# Patient Record
Sex: MALE | Race: WHITE | Employment: OTHER | URBAN - METROPOLITAN AREA
[De-identification: names, ages, dates, MRNs, and addresses within clinical notes are randomized per-mention and may not be internally consistent; named-entity substitution may affect disease eponyms.]

---

## 2021-01-21 ENCOUNTER — APPOINTMENT (OUTPATIENT)
Dept: CT IMAGING | Facility: CLINIC | Age: 86
End: 2021-01-21
Attending: EMERGENCY MEDICINE
Payer: MEDICARE

## 2021-01-21 ENCOUNTER — HOSPITAL ENCOUNTER (EMERGENCY)
Facility: CLINIC | Age: 86
Discharge: HOME | End: 2021-01-21
Attending: EMERGENCY MEDICINE | Admitting: EMERGENCY MEDICINE
Payer: MEDICARE

## 2021-01-21 VITALS
HEART RATE: 79 BPM | TEMPERATURE: 97.6 F | RESPIRATION RATE: 16 BRPM | SYSTOLIC BLOOD PRESSURE: 157 MMHG | OXYGEN SATURATION: 93 % | DIASTOLIC BLOOD PRESSURE: 96 MMHG

## 2021-01-21 DIAGNOSIS — R42 VERTIGO: ICD-10-CM

## 2021-01-21 LAB
ANION GAP SERPL CALCULATED.3IONS-SCNC: 3 MMOL/L (ref 3–14)
BASOPHILS # BLD AUTO: 0 10E9/L (ref 0–0.2)
BASOPHILS NFR BLD AUTO: 0.6 %
BUN SERPL-MCNC: 23 MG/DL (ref 7–30)
CALCIUM SERPL-MCNC: 9.3 MG/DL (ref 8.5–10.1)
CHLORIDE SERPL-SCNC: 108 MMOL/L (ref 94–109)
CO2 SERPL-SCNC: 27 MMOL/L (ref 20–32)
CREAT SERPL-MCNC: 0.56 MG/DL (ref 0.66–1.25)
DIFFERENTIAL METHOD BLD: ABNORMAL
EOSINOPHIL # BLD AUTO: 0.1 10E9/L (ref 0–0.7)
EOSINOPHIL NFR BLD AUTO: 2.8 %
ERYTHROCYTE [DISTWIDTH] IN BLOOD BY AUTOMATED COUNT: 13.5 % (ref 10–15)
GFR SERPL CREATININE-BSD FRML MDRD: >90 ML/MIN/{1.73_M2}
GLUCOSE SERPL-MCNC: 120 MG/DL (ref 70–99)
HCT VFR BLD AUTO: 42.1 % (ref 40–53)
HGB BLD-MCNC: 14.2 G/DL (ref 13.3–17.7)
IMM GRANULOCYTES # BLD: 0 10E9/L (ref 0–0.4)
IMM GRANULOCYTES NFR BLD: 0.6 %
LYMPHOCYTES # BLD AUTO: 0.8 10E9/L (ref 0.8–5.3)
LYMPHOCYTES NFR BLD AUTO: 16.3 %
MCH RBC QN AUTO: 30.8 PG (ref 26.5–33)
MCHC RBC AUTO-ENTMCNC: 33.7 G/DL (ref 31.5–36.5)
MCV RBC AUTO: 91 FL (ref 78–100)
MONOCYTES # BLD AUTO: 0.4 10E9/L (ref 0–1.3)
MONOCYTES NFR BLD AUTO: 7.7 %
NEUTROPHILS # BLD AUTO: 3.5 10E9/L (ref 1.6–8.3)
NEUTROPHILS NFR BLD AUTO: 72 %
NRBC # BLD AUTO: 0 10*3/UL
NRBC BLD AUTO-RTO: 0 /100
PLATELET # BLD AUTO: 126 10E9/L (ref 150–450)
POTASSIUM SERPL-SCNC: 3.5 MMOL/L (ref 3.4–5.3)
RBC # BLD AUTO: 4.61 10E12/L (ref 4.4–5.9)
SODIUM SERPL-SCNC: 138 MMOL/L (ref 133–144)
WBC # BLD AUTO: 4.9 10E9/L (ref 4–11)

## 2021-01-21 PROCEDURE — 99285 EMERGENCY DEPT VISIT HI MDM: CPT | Mod: 25

## 2021-01-21 PROCEDURE — 85025 COMPLETE CBC W/AUTO DIFF WBC: CPT | Performed by: EMERGENCY MEDICINE

## 2021-01-21 PROCEDURE — 80048 BASIC METABOLIC PNL TOTAL CA: CPT | Performed by: EMERGENCY MEDICINE

## 2021-01-21 PROCEDURE — 93005 ELECTROCARDIOGRAM TRACING: CPT

## 2021-01-21 PROCEDURE — 70450 CT HEAD/BRAIN W/O DYE: CPT

## 2021-01-21 RX ORDER — MECLIZINE HYDROCHLORIDE 25 MG/1
25 TABLET ORAL EVERY 6 HOURS PRN
Qty: 10 TABLET | Refills: 1 | Status: SHIPPED | OUTPATIENT
Start: 2021-01-21

## 2021-01-21 ASSESSMENT — ENCOUNTER SYMPTOMS
DIZZINESS: 1
NUMBNESS: 0
ABDOMINAL PAIN: 1
VOMITING: 1
FEVER: 0
SPEECH DIFFICULTY: 0
DIARRHEA: 0
WEAKNESS: 0
HEADACHES: 0

## 2021-01-21 NOTE — ED TRIAGE NOTES
Pt presents via EMS with c/o N/V and lower abdominal pain.  Pt believes he got food poisoning from eating blueberries around 1230p.     Pt received 4mg IV Zofran from EMS.  Currently abdominal pain and nausea is resolved.  ABCs intact, A&Ox4

## 2021-01-21 NOTE — ED PROVIDER NOTES
History     Chief Complaint:  Vomiting and Abdominal Pain     HPI  Derrick Peterson is a 85 year old male who presents for evaluation of vomiting, dizziness and abdominal pain.  The patient states that he went to his exercise class per usual this morning and was then working at his desk, but when he went to stand up, he began suffering right sided abdominal pain and dizziness which caused him to fall to the floor.  He then began suffering a half hour episode of emesis.  This prompted his wife to call EMS.  Here, he states that all of his symptoms are resolved and he feels back to normal now.  He denies any diarrhea and headaches.  He also denies any previous illness.    He does note that he has had food poisoning before but has been otherwise healthy his whole life.  He denies any history of heart problems and diabetes.      Allergies:  No Known Allergies    Medications:   Patient denies any current medications.    Medical History:   Patient denies diabetes and heart conditions.    Social History:  The patient presents to ED via EMS.  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.  PCP: No primary care provider on file.    Review of Systems   Constitutional: Negative for fever.   Gastrointestinal: Positive for abdominal pain (resolved) and vomiting (resolved). Negative for diarrhea.   Neurological: Positive for dizziness (resolved). Negative for syncope, speech difficulty, weakness, numbness and headaches.   All other systems reviewed and are negative.      Physical Exam     Patient Vitals for the past 24 hrs:   BP Temp Temp src Pulse Resp SpO2   01/21/21 1640 (!) 157/96 -- -- 79 -- 93 %   01/21/21 1500 133/76 -- -- 72 -- 92 %   01/21/21 1445 -- -- -- -- -- 95 %   01/21/21 1423 (!) 134/97 97.6  F (36.4  C) Temporal 65 16 95 %        Physical Exam     General: Patient is alert and cooperative.  HENT:  Normal appearance.  No facial weakness or asymmetry.   Eyes: EOMI. No nystagmus.   Neck:  Normal range  of motion and appearance.   Cardiovascular:  Normal rate, regular rhythm.  Pulmonary/Chest:  Effort normal.   Abdominal: Soft. No distension or tenderness.     Musculoskeletal: Normal range of motion. No edema or tenderness.   Neurological: oriented, normal strength, sensation, and coordination.   Skin: Warm and dry. No rash or bruising.   Psychiatric: Normal mood and affect. Normal behavior and judgement.      Emergency Department Course     ECG:  ECG taken at 1512, ECG read at 1520  Sinus rhythm with 1st degree AV block  Nonspecific intraventricular block  abnormal ECG  Rate 65 bpm. AL interval 21 ms. QRS duration 148 ms. QT/QTc 454/472 ms. P-R-T axes 9 66 4.     Imaging:    CT Head w/o Contrast  IMPRESSION: Diffuse cerebral volume loss and cerebral white matter  changes consistent with chronic small vessel ischemic disease. No  evidence for acute intracranial pathology.    Radiation dose for this scan was reduced using automated exposure  control, adjustment of the mA and/or kV according to patient size, or  iterative reconstruction technique    NENA EPRAZA MD  Reading per radiology     Laboratory:  CBC: WBC: 4.9, HGB: 14.2, PLT: 126(L)  BMP: Glucose 120(H),  Creatinine: 0.56(L) o/w WNL    Emergency Department Course:     Reviewed:  I reviewed the patient's nursing notes, vitals, past medical records, Care Everywhere.      Assessments:  1445 I preformed my initial assessment of the patient.    1632 Patient rechecked and updated.      Disposition:  Discharged to home.       Impression & Plan     Medical Decision Making:  Afebrile and asymptomatic 85-year-old male arrives by ambulance after experiencing an episode of acute onset of nausea and vomiting with some associated transient dizziness.  He actually was concerned that he may have experienced food poisoning.  However it seems as though he experienced a transient episode of vertigo which induced the now resolved nausea and vomiting.  He had no associated  headache or additional neurologic symptoms.  He has had no previous history of vertigo.  He has no history of known coronary artery or cerebrovascular disease.  He has a normal physical examination.  Given his clinical presentation and his age, a noncontrast head CT was undertaken showing no evidence of an acute stroke or hemorrhage.  History suggests peripheral process.  Labs and EKG are unremarkable.  He has not required medical intervention.  My clinical impression was discussed with him and he is comfortable with discharge home I have provided a prescription for as needed meclizine, information on vertigo, and recommendations to follow-up with his clinic if recurring symptoms or concerns.      Diagnosis:     ICD-10-CM    1. Vertigo  R42         Disposition:  Discharged to home.    Discharge Medications:  New Prescriptions    MECLIZINE (ANTIVERT) 25 MG TABLET    Take 1 tablet (25 mg) by mouth every 6 hours as needed for dizziness       Scribe Disclosure:  I, Joanna Dykes, am serving as a scribe at 2:50 PM on 1/21/2021 to document services personally performed by Yaw Presley MD based on my observations and the provider's statements to me.     Yaw Georges RD, MD  01/21/21 1924

## 2021-01-22 LAB — INTERPRETATION ECG - MUSE: NORMAL

## 2024-12-10 ENCOUNTER — APPOINTMENT (OUTPATIENT)
Dept: CT IMAGING | Facility: CLINIC | Age: 89
End: 2024-12-10
Attending: EMERGENCY MEDICINE
Payer: MEDICARE

## 2024-12-10 ENCOUNTER — HOSPITAL ENCOUNTER (EMERGENCY)
Facility: CLINIC | Age: 89
Discharge: HOME | End: 2024-12-10
Attending: EMERGENCY MEDICINE
Payer: MEDICARE

## 2024-12-10 VITALS
TEMPERATURE: 98.7 F | RESPIRATION RATE: 18 BRPM | BODY MASS INDEX: 26.18 KG/M2 | HEIGHT: 71 IN | OXYGEN SATURATION: 98 % | WEIGHT: 187 LBS | SYSTOLIC BLOOD PRESSURE: 152 MMHG | DIASTOLIC BLOOD PRESSURE: 96 MMHG | HEART RATE: 69 BPM
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
## 2024-12-10 DIAGNOSIS — S05.12XA PERIORBITAL CONTUSION OF LEFT EYE, INITIAL ENCOUNTER: ICD-10-CM

## 2024-12-10 DIAGNOSIS — S01.81XA FACIAL LACERATION, INITIAL ENCOUNTER: ICD-10-CM

## 2024-12-10 DIAGNOSIS — S00.81XA FACIAL ABRASION, INITIAL ENCOUNTER: ICD-10-CM

## 2024-12-10 PROCEDURE — 90715 TDAP VACCINE 7 YRS/> IM: CPT | Performed by: EMERGENCY MEDICINE

## 2024-12-10 PROCEDURE — 99284 EMERGENCY DEPT VISIT MOD MDM: CPT | Mod: 25

## 2024-12-10 PROCEDURE — 250N000011 HC RX IP 250 OP 636: Performed by: EMERGENCY MEDICINE

## 2024-12-10 PROCEDURE — 70486 CT MAXILLOFACIAL W/O DYE: CPT | Mod: MA

## 2024-12-10 PROCEDURE — 70450 CT HEAD/BRAIN W/O DYE: CPT | Mod: MA

## 2024-12-10 PROCEDURE — 90471 IMMUNIZATION ADMIN: CPT | Performed by: EMERGENCY MEDICINE

## 2024-12-10 RX ADMIN — CLOSTRIDIUM TETANI TOXOID ANTIGEN (FORMALDEHYDE INACTIVATED), CORYNEBACTERIUM DIPHTHERIAE TOXOID ANTIGEN (FORMALDEHYDE INACTIVATED), BORDETELLA PERTUSSIS TOXOID ANTIGEN (GLUTARALDEHYDE INACTIVATED), BORDETELLA PERTUSSIS FILAMENTOUS HEMAGGLUTININ ANTIGEN (FORMALDEHYDE INACTIVATED), BORDETELLA PERTUSSIS PERTACTIN ANTIGEN, AND BORDETELLA PERTUSSIS FIMBRIAE 2/3 ANTIGEN 0.5 ML: 5; 2; 2.5; 5; 3; 5 INJECTION, SUSPENSION INTRAMUSCULAR at 18:03

## 2024-12-10 ASSESSMENT — ACTIVITIES OF DAILY LIVING (ADL)
ADLS_ACUITY_SCORE: 41

## 2024-12-10 ASSESSMENT — COLUMBIA-SUICIDE SEVERITY RATING SCALE - C-SSRS
1. IN THE PAST MONTH, HAVE YOU WISHED YOU WERE DEAD OR WISHED YOU COULD GO TO SLEEP AND NOT WAKE UP?: NO
2. HAVE YOU ACTUALLY HAD ANY THOUGHTS OF KILLING YOURSELF IN THE PAST MONTH?: NO
6. HAVE YOU EVER DONE ANYTHING, STARTED TO DO ANYTHING, OR PREPARED TO DO ANYTHING TO END YOUR LIFE?: NO

## 2024-12-10 NOTE — ED PROVIDER NOTES
"  Emergency Department Note      History of Present Illness     Chief Complaint  Fall    HPI  Derrick Peterson is an 89 year old male with history of hypertension and possible parkinsonism who presents alone for evaluation after a fall.  He was walking out of the liquor store using his cane with a staff member talking to him.  He missed the curb and fell forward.  His glasses hit his face but did not break.  He sustained a superficial laceration to his left cheek.  He denies loss of consciousness.  He denies headache, neck pain, facial pain, vision changes, nausea, or vomiting.  He does not know if he has ever had a tetanus booster.    Independent Historian  None    Review of External Notes  I reviewed the Neurology note form 11/19/24 - patient seen for possible Parkinson's disease  Past Medical History   Medical History and Problem List   Hyperlipidemia  Hypertension  Spinal stenosis   PVD  Primary osteoarthritis of left shoulder    Medications   Antivert   Metoprolol succinate  Lipitor     Surgical History   Lumbar puncture   Tonsillectomy and adenoidectomy  Shoulder surgery   Physical Exam   Patient Vitals for the past 24 hrs:   BP Temp Temp src Pulse Resp SpO2 Height Weight   12/10/24 1931 (!) 152/96 98.7  F (37.1  C) Oral 69 18 98 % -- --   12/10/24 1800 (!) 169/88 -- -- 85 18 96 % -- --   12/10/24 1416 (!) 183/108 (!) 96.3  F (35.7  C) Temporal 65 18 97 % 1.803 m (5' 11\") 84.8 kg (187 lb)     Physical Exam  General: Well-developed and well-nourished. Well appearing elderly  man. Cooperative.  Head:  2 centimeter superficial laceration in a horizontal manner over the left cheek.  There is early ecchymosis and edema in the left periorbital region, primarily in the supraorbital ridge where there is a superficial abrasion.  Eyes:  Conjunctivae, lids, and sclerae are normal.  PERRL.  ENT:    Normal nose. Moist mucous membranes.  Neck:  Supple. Normal range of motion.  No midline tenderness.  CV:  Warm and " well-perfused.  Resp:  No respiratory distress.   GI:  Non-distended.     MS:  Normal ROM. No tenderness to palpation of the hands or knees  Skin:  Warm. Non-diaphoretic. No pallor.  Facial findings as above.  Neuro:  Awake. A&Ox3.   Psych: Normal mood and affect. Normal speech.  Vitals reviewed.    Diagnostics   Imaging  CT Facial Bones without Contrast   Final Result   IMPRESSION:   HEAD CT:   1.  Negative for acute intracranial hemorrhage or skull fracture.      FACIAL BONE CT:   1.  No facial bone or mandibular fracture.         CT Head w/o Contrast   Final Result   IMPRESSION:   HEAD CT:   1.  Negative for acute intracranial hemorrhage or skull fracture.      FACIAL BONE CT:   1.  No facial bone or mandibular fracture.           Report per radiology    Independent Interpretation  CT Head: No intracranial hemorrhage.  ED Course    Medications Administered  Medications   Tdap (tetanus-diphtheria-acell pertussis) (ADACEL) injection 0.5 mL (0.5 mLs Intramuscular $Given 12/10/24 1803)     Procedures  Procedures     Laceration Repair      Procedure: Laceration Repair    Indication: Laceration    Consent: Verbal    Tetanus status reviewed    Location: Left Cheek    Length: 2 cm    Preparation: Irrigation with Sterile Saline.    Anesthesia/Sedation: None      Treatment/Exploration: Wound explored, no foreign bodies found     Closure: The wound was closed with Steri Strips    Patient Status: The patient tolerated the procedure well: Yes. There were no complications.    Additional Documentation  , supportive wife  Has a car and and drive    ED Course  ED Course as of 12/10/24 2122   Tue Dec 10, 2024   1711 I obtained history and examined the patient as noted above.    1803 I rechecked the patient.   1822 I rechecked the patient and I performed a laceration repair with Steri Strips as noted above.    1933 I rechecked the patient and explained findings. I prepared the patient to be discharged home.      Medical  Decision Making / Diagnosis   EMILIA Meza is an 89 year old man ***    Disposition  The patient was discharged.     ICD-10 Codes:    ICD-10-CM    1. Fall from slip, trip, or stumble, initial encounter  W01.0XXA       2. Facial laceration, initial encounter  S01.81XA       3. Facial abrasion, initial encounter  S00.81XA       4. Periorbital contusion of left eye, initial encounter  S05.12XA            Discharge Medications  Discharge Medication List as of 12/10/2024  7:33 PM        Scribe Disclosure:  I, Anel Pavon, am serving as a scribe at 5:13 PM on 12/10/2024 to document services personally performed by Britney Nelson MD based on my observations and the provider's statements to me.

## 2024-12-10 NOTE — ED TRIAGE NOTES
Pt presents for evaluation after missing a curb and fell. Landed on concrete. No LOC, no thinners. Was wearing glasses and the frame broke. Has a laceration to left cheek and an abrasion to left forehead. Denies neck pain. Unknown last tetanus.

## 2024-12-11 NOTE — DISCHARGE INSTRUCTIONS
Keep wounds clean and dry.  The tape on your cut will curl up at the edges and then you can remove it after about 1 week.  Tylenol as needed for pain.  Return with worsening symptoms new concerns of any kind.  Follow-up with primary care as needed.